# Patient Record
Sex: MALE | Race: BLACK OR AFRICAN AMERICAN | Employment: FULL TIME | URBAN - METROPOLITAN AREA
[De-identification: names, ages, dates, MRNs, and addresses within clinical notes are randomized per-mention and may not be internally consistent; named-entity substitution may affect disease eponyms.]

---

## 2024-12-19 ENCOUNTER — OFFICE VISIT (OUTPATIENT)
Dept: PRIMARY CARE CLINIC | Age: 44
End: 2024-12-19

## 2024-12-19 VITALS
BODY MASS INDEX: 17.73 KG/M2 | HEIGHT: 75 IN | SYSTOLIC BLOOD PRESSURE: 128 MMHG | OXYGEN SATURATION: 98 % | HEART RATE: 86 BPM | DIASTOLIC BLOOD PRESSURE: 78 MMHG | WEIGHT: 142.6 LBS

## 2024-12-19 DIAGNOSIS — Z13.1 SCREENING FOR DIABETES MELLITUS: ICD-10-CM

## 2024-12-19 DIAGNOSIS — Z00.00 ROUTINE ADULT HEALTH MAINTENANCE: ICD-10-CM

## 2024-12-19 DIAGNOSIS — Z11.4 SCREENING FOR HIV (HUMAN IMMUNODEFICIENCY VIRUS): ICD-10-CM

## 2024-12-19 DIAGNOSIS — Z13.220 SCREENING, LIPID: ICD-10-CM

## 2024-12-19 DIAGNOSIS — M25.551 BILATERAL HIP PAIN: Primary | ICD-10-CM

## 2024-12-19 DIAGNOSIS — Z13.29 SCREENING FOR THYROID DISORDER: ICD-10-CM

## 2024-12-19 DIAGNOSIS — Z11.59 NEED FOR HEPATITIS C SCREENING TEST: ICD-10-CM

## 2024-12-19 DIAGNOSIS — M25.552 BILATERAL HIP PAIN: Primary | ICD-10-CM

## 2024-12-19 LAB
ALBUMIN SERPL-MCNC: 4.9 G/DL (ref 3.5–4.6)
ALP SERPL-CCNC: 129 U/L (ref 35–104)
ALT SERPL-CCNC: 84 U/L (ref 0–41)
ANION GAP SERPL CALCULATED.3IONS-SCNC: 14 MEQ/L (ref 9–15)
AST SERPL-CCNC: 107 U/L (ref 0–40)
BASOPHILS # BLD: 0 K/UL (ref 0–0.2)
BASOPHILS NFR BLD: 0.4 %
BILIRUB SERPL-MCNC: 0.8 MG/DL (ref 0.2–0.7)
BUN SERPL-MCNC: 8 MG/DL (ref 6–20)
CALCIUM SERPL-MCNC: 9.9 MG/DL (ref 8.5–9.9)
CHLORIDE SERPL-SCNC: 94 MEQ/L (ref 95–107)
CHOLEST SERPL-MCNC: 200 MG/DL (ref 0–199)
CO2 SERPL-SCNC: 29 MEQ/L (ref 20–31)
CREAT SERPL-MCNC: 0.68 MG/DL (ref 0.7–1.2)
EOSINOPHIL # BLD: 0 K/UL (ref 0–0.7)
EOSINOPHIL NFR BLD: 0.6 %
ERYTHROCYTE [DISTWIDTH] IN BLOOD BY AUTOMATED COUNT: 14.6 % (ref 11.5–14.5)
GLOBULIN SER CALC-MCNC: 2.8 G/DL (ref 2.3–3.5)
GLUCOSE SERPL-MCNC: 102 MG/DL (ref 70–99)
HCT VFR BLD AUTO: 43.8 % (ref 42–52)
HDLC SERPL-MCNC: 93 MG/DL (ref 40–59)
HGB BLD-MCNC: 14.6 G/DL (ref 14–18)
LDL CHOLESTEROL: 96 MG/DL (ref 0–129)
LYMPHOCYTES # BLD: 1.4 K/UL (ref 1–4.8)
LYMPHOCYTES NFR BLD: 27.9 %
MCH RBC QN AUTO: 30 PG (ref 27–31.3)
MCHC RBC AUTO-ENTMCNC: 33.3 % (ref 33–37)
MCV RBC AUTO: 90.1 FL (ref 79–92.2)
MONOCYTES # BLD: 0.6 K/UL (ref 0.2–0.8)
MONOCYTES NFR BLD: 12 %
NEUTROPHILS # BLD: 3 K/UL (ref 1.4–6.5)
NEUTS SEG NFR BLD: 58.7 %
PLATELET # BLD AUTO: 221 K/UL (ref 130–400)
POTASSIUM SERPL-SCNC: 4 MEQ/L (ref 3.4–4.9)
PROT SERPL-MCNC: 7.7 G/DL (ref 6.3–8)
RBC # BLD AUTO: 4.86 M/UL (ref 4.7–6.1)
SODIUM SERPL-SCNC: 137 MEQ/L (ref 135–144)
TRIGLYCERIDE, FASTING: 56 MG/DL (ref 0–150)
TSH REFLEX: 2.54 UIU/ML (ref 0.44–3.86)
WBC # BLD AUTO: 5 K/UL (ref 4.8–10.8)

## 2024-12-19 RX ORDER — IBUPROFEN 600 MG/1
600 TABLET, FILM COATED ORAL 3 TIMES DAILY PRN
Qty: 30 TABLET | Refills: 5 | Status: SHIPPED | OUTPATIENT
Start: 2024-12-19

## 2024-12-19 SDOH — ECONOMIC STABILITY: FOOD INSECURITY: WITHIN THE PAST 12 MONTHS, YOU WORRIED THAT YOUR FOOD WOULD RUN OUT BEFORE YOU GOT MONEY TO BUY MORE.: NEVER TRUE

## 2024-12-19 SDOH — ECONOMIC STABILITY: INCOME INSECURITY: HOW HARD IS IT FOR YOU TO PAY FOR THE VERY BASICS LIKE FOOD, HOUSING, MEDICAL CARE, AND HEATING?: NOT VERY HARD

## 2024-12-19 SDOH — ECONOMIC STABILITY: FOOD INSECURITY: WITHIN THE PAST 12 MONTHS, THE FOOD YOU BOUGHT JUST DIDN'T LAST AND YOU DIDN'T HAVE MONEY TO GET MORE.: NEVER TRUE

## 2024-12-19 ASSESSMENT — PATIENT HEALTH QUESTIONNAIRE - PHQ9
SUM OF ALL RESPONSES TO PHQ QUESTIONS 1-9: 0
SUM OF ALL RESPONSES TO PHQ9 QUESTIONS 1 & 2: 0
2. FEELING DOWN, DEPRESSED OR HOPELESS: NOT AT ALL
SUM OF ALL RESPONSES TO PHQ QUESTIONS 1-9: 0
1. LITTLE INTEREST OR PLEASURE IN DOING THINGS: NOT AT ALL

## 2024-12-19 NOTE — PROGRESS NOTES
bilateral hips.).      Cervical back: Normal range of motion.      Comments: Pain on hip flexion bilaterally  Pain on hip external rotation bilaterally   Skin:     Findings: No rash.   Neurological:      General: No focal deficit present.      Mental Status: He is alert and oriented to person, place, and time.   Psychiatric:         Mood and Affect: Mood normal.               Reviewed with the patient: current clinical status, medications, activities and diet.     Side effects, adverse effects of the medication prescribed today, as well as treatment plan/ rationale and result expectations have been discussed with the patient who expresses understanding and desires to proceed.    Close follow up to evaluate treatment results and for coordination of care.  I have reviewed the patient's medical history in detail and updated the computerized patient record.    Daron Castillo MD

## 2024-12-20 DIAGNOSIS — R73.03 PREDIABETES: ICD-10-CM

## 2024-12-20 DIAGNOSIS — M87.051 AVASCULAR NECROSIS OF BONES OF BOTH HIPS: Primary | ICD-10-CM

## 2024-12-20 DIAGNOSIS — R74.01 TRANSAMINITIS: ICD-10-CM

## 2024-12-20 DIAGNOSIS — M87.052 AVASCULAR NECROSIS OF BONES OF BOTH HIPS: Primary | ICD-10-CM

## 2024-12-20 LAB
ESTIMATED AVERAGE GLUCOSE: 123 MG/DL
HBA1C MFR BLD: 5.9 % (ref 4–6)
HEPATITIS C ANTIBODY: NONREACTIVE
HIV AG/AB: NONREACTIVE

## 2025-01-03 ENCOUNTER — OFFICE VISIT (OUTPATIENT)
Dept: ORTHOPEDIC SURGERY | Age: 45
End: 2025-01-03

## 2025-01-03 VITALS
WEIGHT: 142 LBS | HEIGHT: 75 IN | HEART RATE: 77 BPM | OXYGEN SATURATION: 99 % | BODY MASS INDEX: 17.66 KG/M2 | TEMPERATURE: 98.4 F

## 2025-01-03 DIAGNOSIS — M16.11 PRIMARY OSTEOARTHRITIS OF RIGHT HIP: Primary | ICD-10-CM

## 2025-01-03 DIAGNOSIS — M16.12 PRIMARY OSTEOARTHRITIS OF LEFT HIP: ICD-10-CM

## 2025-01-03 DIAGNOSIS — M87.851 OTHER OSTEONECROSIS, RIGHT FEMUR: ICD-10-CM

## 2025-01-03 DIAGNOSIS — M87.852 OTHER OSTEONECROSIS, LEFT FEMUR: ICD-10-CM

## 2025-01-03 ASSESSMENT — ENCOUNTER SYMPTOMS
EYE DISCHARGE: 0
DIARRHEA: 0
SHORTNESS OF BREATH: 0
EYE PAIN: 0
ABDOMINAL PAIN: 0
COUGH: 0
CONSTIPATION: 0
EYE ITCHING: 0

## 2025-01-03 NOTE — PROGRESS NOTES
Patient ID:  Robi Dotson is a 44 y.o. male who presents today for evaluation of bilateral hip pain.  Left more than right.  Left one year, right 6 months.  Steroid shot on left.  A year ago    Injury: no  Metal Allergy: no    Location of pain:  bilateral groin and anterior thigh  Pain: yes; 7 on a scale of 1 to 10  Onset: gradual  Duration: 1 years  Frequency:  occurs daily  Quality: aching and boring   Swelling: none  Aggravating factors: weight bearing activity, standing, and walking  Alleviating factors: removing weight from leg and rest  Mechanical symptoms: none  Radiation: no    Activities: walking independently  Restriction:  decreased ambulatory tolerance  Progression:  worsening    Previous treatment:  anti-inflammatories and steroid injection   NSAIDs:  ibuprofen/motrin  PT:  none    Medications:    Current Outpatient Medications on File Prior to Visit   Medication Sig Dispense Refill    ibuprofen (ADVIL;MOTRIN) 600 MG tablet Take 1 tablet by mouth 3 times daily as needed for Pain 30 tablet 5     No current facility-administered medications on file prior to visit.       Allergies:    No Known Allergies    Past Medical History:    No past medical history on file.    Past Surgical History:    No past surgical history on file.    Social History:    Social History     Socioeconomic History    Marital status: Unknown     Spouse name: Not on file    Number of children: Not on file    Years of education: Not on file    Highest education level: Not on file   Occupational History    Not on file   Tobacco Use    Smoking status: Every Day     Current packs/day: 0.50     Types: Cigarettes    Smokeless tobacco: Never   Vaping Use    Vaping status: Never Used   Substance and Sexual Activity    Alcohol use: Yes     Alcohol/week: 2.0 standard drinks of alcohol     Types: 2 Glasses of wine per week    Drug use: Not Currently    Sexual activity: Not on file   Other Topics Concern    Not on file   Social History

## 2025-01-08 ENCOUNTER — HOSPITAL ENCOUNTER (OUTPATIENT)
Dept: MRI IMAGING | Age: 45
Discharge: HOME | End: 2025-01-10
Payer: COMMERCIAL

## 2025-01-08 DIAGNOSIS — M16.12 PRIMARY OSTEOARTHRITIS OF LEFT HIP: ICD-10-CM

## 2025-01-08 DIAGNOSIS — M87.852 OTHER OSTEONECROSIS, LEFT FEMUR: ICD-10-CM

## 2025-01-08 PROCEDURE — 73721 MRI JNT OF LWR EXTRE W/O DYE: CPT

## 2025-01-16 ENCOUNTER — OFFICE VISIT (OUTPATIENT)
Dept: ORTHOPEDIC SURGERY | Age: 45
End: 2025-01-16
Payer: COMMERCIAL

## 2025-01-16 VITALS
HEART RATE: 84 BPM | HEIGHT: 75 IN | OXYGEN SATURATION: 96 % | WEIGHT: 142 LBS | BODY MASS INDEX: 17.66 KG/M2 | TEMPERATURE: 98 F

## 2025-01-16 DIAGNOSIS — M87.052 AVASCULAR NECROSIS OF LEFT FEMUR: Primary | ICD-10-CM

## 2025-01-16 DIAGNOSIS — M87.051 AVASCULAR NECROSIS OF RIGHT FEMUR: ICD-10-CM

## 2025-01-16 PROCEDURE — 99214 OFFICE O/P EST MOD 30 MIN: CPT | Performed by: ORTHOPAEDIC SURGERY

## 2025-01-16 NOTE — PROGRESS NOTES
Patient ID:  Robi Dotson is a 44 y.o. male who presents today for follow-up of left hip pain.  It appeared that the patient had bilateral hip avascular necrosis.  He was sent for an MRI.  MRI from 1/9/2025 shows:    FINDINGS:  BONE MARROW: No evidence of acute fracture or aggressive marrow replacing  lesion.     HIP JOINT: Advanced left avascular necrosis is observed.  In the left hip, it  measures 4.3 x 4.9 cm, involving the anterior superior, superior, and  posterosuperior aspects of the femoral head.  There is flattening of the  femoral head contour.  Associated subchondral cysts are observed.  There is  an associated moderate-sized joint effusion and generalized reactive edema  throughout the femoral head and proximal neck small grade 4 chondral fissure  noted in the anterior superior acetabular zone.  Large areas of grade 2  chondral thinning are otherwise seen in the superior and posterosuperior  zones.     Similar advanced avascular necrosis and osteoarthrosis observed in the right  hip, along with joint effusion     LABRUM: Intact anterior, anterior superior, superior, posterosuperior, and  posterior labrum     BURSAE: No significant iliopsoas or trochanteric bursitis.     SCIATIC NERVE: Normal MRI appearance of the sciatic nerve.     MUSCLES / TENDONS: No evidence of gluteal tendon tear. Intact hamstrings  tendon origin. No significant muscle edema or atrophy.     INTRAPELVIC CONTENTS / SOFT TISSUES: Limited images of the intrapelvic  contents demonstrate no acute abnormality.     IMPRESSION:  1. 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  2. Bilateral hip osteoarthrosis, with small grade 4 chondral fissure in the  anterior superior acetabular zone.    Injury: no    Location of pain:  bilateral groin and anterior thigh  Pain: yes; 7 on a scale of 1 to 10  Onset:

## 2025-01-30 ENCOUNTER — TELEPHONE (OUTPATIENT)
Dept: ORTHOPEDIC SURGERY | Age: 45
End: 2025-01-30

## 2025-01-31 ENCOUNTER — TELEPHONE (OUTPATIENT)
Dept: ORTHOPEDIC SURGERY | Age: 45
End: 2025-01-31

## 2025-01-31 DIAGNOSIS — M16.12 PRIMARY OSTEOARTHRITIS OF LEFT HIP: Primary | ICD-10-CM

## 2025-01-31 DIAGNOSIS — M87.052 AVASCULAR NECROSIS OF LEFT FEMUR: ICD-10-CM

## 2025-01-31 NOTE — TELEPHONE ENCOUNTER
Surgery Phone: [phone]   [unfilled]   Surgery Fax: [phone]    Phone: [phone]          Fax: [phone]    Orthopedics: Surgery Scheduling, PAT & PRE-OP Order Form  Call to advance [place] at [phone] at least 24 hours prior to date of service     Surgery Location: [facility] Surgery: [facility] [place] [address]  OFFICE   Surgeon's Name:Miguel Conley MD Surgery Date: 25  Time: 730  Patient's Name: Robi Majano : 1980    #:  [id number]  Gender: male  Home Phone:  [phone] Cell Phone: [phone]  Emergency Contact:  tierney majano   Phone: [phone]  Payor: [email or web address] [place] /  /  /    ID No.: [unfilled]      PROVIDER TO COMPLETE:  Diagnosis: The primary encounter diagnosis was Primary osteoarthritis of left hip. A diagnosis of Avascular necrosis of left femur was also pertinent to this visit.   Procedure/Consent: Left total hip arthroplasty - ANTERIOR  CPT CODES: 64845  Case Comments/Implants: [place] Accolade Stem and Trident 2 cup System  Surgery Scheduled as: Outpatient  Anesthesia Requested: Choice  Referring Family Doctor: Daron Castillo MD   PAT  [x] [facility] PAT Date/Time:                                                            [x] History & Physical [] Physician will Provide [] Attached [] Dictated [] Other  [x] Follow Anesthesia Pre-Op Orders for X-rays, [facility]     [x] SN & PT to evaluate and treat/educate disease management, medications, home safety & equipment needs for total joint patients  [] Other: ____________________________________________________  Consults: Medical/Cardiac Clearance done by  ____________________  PRE-OP ORDERS:   Allergies: Patient has no known allergies. Latex Allergies:             Diabetic:           [] IV ________________________  [x] IV Start with J-loop     Preprinted Orders: Attached [] Yes [] No   ANTIBIOTIC PRE-OP: [x] ANCEF 2 gram IVPB if > 120 kg 3 grams IVPB within 1 hour of

## 2025-02-06 ENCOUNTER — PREP FOR PROCEDURE (OUTPATIENT)
Dept: ORTHOPEDIC SURGERY | Age: 45
End: 2025-02-06

## 2025-02-06 DIAGNOSIS — M87.052 AVASCULAR NECROSIS OF FEMORAL HEAD, LEFT (HCC): ICD-10-CM

## 2025-02-18 ENCOUNTER — HOSPITAL ENCOUNTER (OUTPATIENT)
Dept: PREADMISSION TESTING | Age: 45
Discharge: HOME | End: 2025-02-22
Payer: COMMERCIAL

## 2025-02-18 ENCOUNTER — OFFICE VISIT (OUTPATIENT)
Dept: ORTHOPEDIC SURGERY | Age: 45
End: 2025-02-18
Payer: COMMERCIAL

## 2025-02-18 VITALS
RESPIRATION RATE: 16 BRPM | WEIGHT: 142 LBS | BODY MASS INDEX: 17.66 KG/M2 | HEIGHT: 75 IN | DIASTOLIC BLOOD PRESSURE: 82 MMHG | HEART RATE: 98 BPM | SYSTOLIC BLOOD PRESSURE: 138 MMHG

## 2025-02-18 DIAGNOSIS — M87.052 AVASCULAR NECROSIS OF LEFT FEMUR (HCC): ICD-10-CM

## 2025-02-18 DIAGNOSIS — Z96.642 HISTORY OF LEFT HIP REPLACEMENT: Primary | ICD-10-CM

## 2025-02-18 LAB
ABO/RH: NORMAL
ANION GAP SERPL CALCULATED.3IONS-SCNC: 16 MEQ/L (ref 9–15)
ANTIBODY SCREEN: NORMAL
BASOPHILS # BLD: 0 K/UL (ref 0–0.2)
BASOPHILS NFR BLD: 0.6 %
BUN SERPL-MCNC: 5 MG/DL (ref 6–20)
CALCIUM SERPL-MCNC: 9.9 MG/DL (ref 8.5–9.9)
CHLORIDE SERPL-SCNC: 96 MEQ/L (ref 95–107)
CO2 SERPL-SCNC: 28 MEQ/L (ref 20–31)
CREAT SERPL-MCNC: 0.62 MG/DL (ref 0.7–1.2)
EOSINOPHIL # BLD: 0 K/UL (ref 0–0.7)
EOSINOPHIL NFR BLD: 0.4 %
ERYTHROCYTE [DISTWIDTH] IN BLOOD BY AUTOMATED COUNT: 13.2 % (ref 11.5–14.5)
GLUCOSE SERPL-MCNC: 113 MG/DL (ref 70–99)
HCT VFR BLD AUTO: 41.8 % (ref 42–52)
HGB BLD-MCNC: 13.7 G/DL (ref 14–18)
LYMPHOCYTES # BLD: 1.5 K/UL (ref 1–4.8)
LYMPHOCYTES NFR BLD: 27.3 %
MCH RBC QN AUTO: 30.9 PG (ref 27–31.3)
MCHC RBC AUTO-ENTMCNC: 32.8 % (ref 33–37)
MCV RBC AUTO: 94.4 FL (ref 79–92.2)
MONOCYTES # BLD: 0.6 K/UL (ref 0.2–0.8)
MONOCYTES NFR BLD: 11 %
NEUTROPHILS # BLD: 3.2 K/UL (ref 1.4–6.5)
NEUTS SEG NFR BLD: 60.5 %
PLATELET # BLD AUTO: 222 K/UL (ref 130–400)
POTASSIUM SERPL-SCNC: 4.1 MEQ/L (ref 3.4–4.9)
RBC # BLD AUTO: 4.43 M/UL (ref 4.7–6.1)
SODIUM SERPL-SCNC: 140 MEQ/L (ref 135–144)
WBC # BLD AUTO: 5.4 K/UL (ref 4.8–10.8)

## 2025-02-18 PROCEDURE — 87641 MR-STAPH DNA AMP PROBE: CPT

## 2025-02-18 PROCEDURE — 80048 BASIC METABOLIC PNL TOTAL CA: CPT

## 2025-02-18 PROCEDURE — 86901 BLOOD TYPING SEROLOGIC RH(D): CPT

## 2025-02-18 PROCEDURE — 86900 BLOOD TYPING SEROLOGIC ABO: CPT

## 2025-02-18 PROCEDURE — 99024 POSTOP FOLLOW-UP VISIT: CPT | Performed by: PHYSICIAN ASSISTANT

## 2025-02-18 PROCEDURE — 86850 RBC ANTIBODY SCREEN: CPT

## 2025-02-18 PROCEDURE — 85025 COMPLETE CBC W/AUTO DIFF WBC: CPT

## 2025-02-18 PROCEDURE — 99424 PRIN CARE MGMT PHYS 1ST 30: CPT | Performed by: PHYSICIAN ASSISTANT

## 2025-02-18 RX ORDER — SODIUM CHLORIDE 9 MG/ML
INJECTION, SOLUTION INTRAVENOUS PRN
OUTPATIENT
Start: 2025-02-25

## 2025-02-18 RX ORDER — SODIUM CHLORIDE 0.9 % (FLUSH) 0.9 %
5-40 SYRINGE (ML) INJECTION EVERY 12 HOURS SCHEDULED
OUTPATIENT
Start: 2025-02-25

## 2025-02-18 RX ORDER — SODIUM CHLORIDE, SODIUM LACTATE, POTASSIUM CHLORIDE, CALCIUM CHLORIDE 600; 310; 30; 20 MG/100ML; MG/100ML; MG/100ML; MG/100ML
INJECTION, SOLUTION INTRAVENOUS CONTINUOUS
OUTPATIENT
Start: 2025-02-25

## 2025-02-18 RX ORDER — CHLORHEXIDINE GLUCONATE 40 MG/ML
SOLUTION TOPICAL
Qty: 118 ML | Refills: 0 | Status: SHIPPED | OUTPATIENT
Start: 2025-02-18

## 2025-02-18 RX ORDER — SODIUM CHLORIDE 0.9 % (FLUSH) 0.9 %
5-40 SYRINGE (ML) INJECTION PRN
OUTPATIENT
Start: 2025-02-25

## 2025-02-18 NOTE — H&P
[facility]    H&P: Preadmission Testing     Patient: Robi Dotson  YOB: 1980  MRN: [id number]    Subjective:     Chief Complaint   Patient presents with    Pre-op Exam     Patient presents to clinic for preop left total hip athroplasty  Symptoms feeling well, minimal pain   Pain 3       HPI: Robi Dotson is a 44 y.o. maleis here for left anterior total hip replacement to be performed by Dr. Conley.       There is no known history of heart disease or infarction.  There is no recent chest pain or discomfort, palpitations, shortness of breath, BHAKTA, difficulties with exercise, bilateral ankle swelling.  Not taking any blood thinners.    There is no known history of obstructive or restrictive lung disease.    No smoking.  No recent wheezing or use of any kind of inhalers.  No recent hospitalizations for any lung-related illnesses. No recent Covid infection.    No known history of gastric issues which includes ulcers, herniations, bariatric surgeries.  No recent GI bleeds    No known history of hyper or hypercoagulable states.    They are not diabetic.  They have normal kidney function.     Past Medical History:    No past medical history on file.  Past Surgical History:    No past surgical history on file.    Medications Prior to Admission:    Current Outpatient Medications   Medication Sig Dispense Refill    chlorhexidine gluconate (HIBICLENS) 4 % SOLN external solution Use daily for 3 days prior to surgery 118 mL 0    Misc. Devices (WALKER) MISC 1 each by Does not apply route daily Front wheeled walker -  fax to [phone] 1 each 0    Misc. Devices (CANE) MISC 1 Units by Does not apply route as needed (ambulation) 1 each 0    Misc. Devices (RAISED TOILET SEAT) MISC Dispense 1 raised toilet seat 1 each 0    Misc. Devices (BATH/SHOWER SEAT) MISC Dispense 1 Shower/Bath seat 1 each 0    Misc. Devices (BATH/SHOWER SEAT) MISC Dispense 1 Shower/Bath seat 1 each 0

## 2025-02-18 NOTE — PATIENT INSTRUCTIONS
-please ensure that blood work is done at least 3 days before your surgery. If there are any abnormalities that are out of the norm for you, I will reach out to discuss those results within the next two days.     -hibiclens was sent to your pharmacy to .  Please follow the instructions provided with the prescription and use daily 3 days leading up to surgery.     -all anti-inflammatories need to be stopped 5 days leading up to the surgery.  This includes asprin (most importantly) ibuprofen, naprosyn, meloxicam, celecoxib, aleve, diflonac, toradol, etc. If you experiencing pain, the only medication you can safely take is tylenol 650 mg 3-4x / day (do not exceed 4000 mg).    -our surgery department will reach out the you the evening before your surgery to let you know what time to arrive at the Outpatient Entrance (door B)    -no eating / drinking the after midnight the night before surgery. Sips of water the morning of for all other medications is OK    If you have any questions, please call our office and I will be happy to answer them

## 2025-02-19 LAB
MRSA, DNA, NASAL: NEGATIVE
SPECIMEN DESCRIPTION: NORMAL

## 2025-02-25 ENCOUNTER — HOSPITAL ENCOUNTER (OUTPATIENT)
Age: 45
Setting detail: OBSERVATION
Discharge: HOME HEALTH | End: 2025-02-25
Attending: ORTHOPAEDIC SURGERY | Admitting: ORTHOPAEDIC SURGERY
Payer: COMMERCIAL

## 2025-02-25 ENCOUNTER — ANESTHESIA (OUTPATIENT)
Dept: OPERATING ROOM | Age: 45
End: 2025-02-25
Payer: COMMERCIAL

## 2025-02-25 ENCOUNTER — APPOINTMENT (OUTPATIENT)
Dept: GENERAL RADIOLOGY | Age: 45
End: 2025-02-25
Attending: ORTHOPAEDIC SURGERY
Payer: COMMERCIAL

## 2025-02-25 ENCOUNTER — ANESTHESIA EVENT (OUTPATIENT)
Dept: OPERATING ROOM | Age: 45
End: 2025-02-25
Payer: COMMERCIAL

## 2025-02-25 VITALS
HEART RATE: 81 BPM | OXYGEN SATURATION: 99 % | SYSTOLIC BLOOD PRESSURE: 136 MMHG | BODY MASS INDEX: 17.71 KG/M2 | TEMPERATURE: 98.6 F | HEIGHT: 77 IN | WEIGHT: 150 LBS | DIASTOLIC BLOOD PRESSURE: 95 MMHG | RESPIRATION RATE: 12 BRPM

## 2025-02-25 DIAGNOSIS — Z96.642 STATUS POST TOTAL HIP REPLACEMENT, LEFT: Primary | ICD-10-CM

## 2025-02-25 DIAGNOSIS — G89.18 POST-OP PAIN: ICD-10-CM

## 2025-02-25 PROCEDURE — G0378 HOSPITAL OBSERVATION PER HR: HCPCS

## 2025-02-25 PROCEDURE — 2580000003 HC RX 258: Performed by: NURSE PRACTITIONER

## 2025-02-25 PROCEDURE — 2500000003 HC RX 250 WO HCPCS: Performed by: ORTHOPAEDIC SURGERY

## 2025-02-25 PROCEDURE — 6360000002 HC RX W HCPCS: Performed by: STUDENT IN AN ORGANIZED HEALTH CARE EDUCATION/TRAINING PROGRAM

## 2025-02-25 PROCEDURE — 97162 PT EVAL MOD COMPLEX 30 MIN: CPT

## 2025-02-25 PROCEDURE — 97110 THERAPEUTIC EXERCISES: CPT

## 2025-02-25 PROCEDURE — 72170 X-RAY EXAM OF PELVIS: CPT

## 2025-02-25 PROCEDURE — 6370000000 HC RX 637 (ALT 250 FOR IP): Performed by: NURSE PRACTITIONER

## 2025-02-25 PROCEDURE — 94664 DEMO&/EVAL PT USE INHALER: CPT

## 2025-02-25 PROCEDURE — 2580000003 HC RX 258: Performed by: PHYSICIAN ASSISTANT

## 2025-02-25 PROCEDURE — 7100000001 HC PACU RECOVERY - ADDTL 15 MIN: Performed by: ORTHOPAEDIC SURGERY

## 2025-02-25 PROCEDURE — 7100000000 HC PACU RECOVERY - FIRST 15 MIN: Performed by: ORTHOPAEDIC SURGERY

## 2025-02-25 PROCEDURE — 3600000014 HC SURGERY LEVEL 4 ADDTL 15MIN: Performed by: ORTHOPAEDIC SURGERY

## 2025-02-25 PROCEDURE — 97530 THERAPEUTIC ACTIVITIES: CPT

## 2025-02-25 PROCEDURE — 3600000004 HC SURGERY LEVEL 4 BASE: Performed by: ORTHOPAEDIC SURGERY

## 2025-02-25 PROCEDURE — 6360000002 HC RX W HCPCS: Performed by: PHYSICIAN ASSISTANT

## 2025-02-25 PROCEDURE — 27130 TOTAL HIP ARTHROPLASTY: CPT | Performed by: PHYSICIAN ASSISTANT

## 2025-02-25 PROCEDURE — 2580000003 HC RX 258: Performed by: ORTHOPAEDIC SURGERY

## 2025-02-25 PROCEDURE — C1776 JOINT DEVICE (IMPLANTABLE): HCPCS | Performed by: ORTHOPAEDIC SURGERY

## 2025-02-25 PROCEDURE — 3700000001 HC ADD 15 MINUTES (ANESTHESIA): Performed by: ORTHOPAEDIC SURGERY

## 2025-02-25 PROCEDURE — 3700000000 HC ANESTHESIA ATTENDED CARE: Performed by: ORTHOPAEDIC SURGERY

## 2025-02-25 PROCEDURE — 27130 TOTAL HIP ARTHROPLASTY: CPT | Performed by: ORTHOPAEDIC SURGERY

## 2025-02-25 PROCEDURE — 6360000002 HC RX W HCPCS: Performed by: ORTHOPAEDIC SURGERY

## 2025-02-25 PROCEDURE — 2720000010 HC SURG SUPPLY STERILE: Performed by: ORTHOPAEDIC SURGERY

## 2025-02-25 PROCEDURE — 6360000002 HC RX W HCPCS: Performed by: NURSE PRACTITIONER

## 2025-02-25 PROCEDURE — 2709999900 HC NON-CHARGEABLE SUPPLY: Performed by: ORTHOPAEDIC SURGERY

## 2025-02-25 DEVICE — TRIDENT X3 0 DEGREE POLYETHYLENE INSERT
Type: IMPLANTABLE DEVICE | Site: HIP | Status: FUNCTIONAL
Brand: TRIDENT X3 INSERT

## 2025-02-25 DEVICE — UNIVERSAL V40 TAPER ADAPTER SLEEVE
Type: IMPLANTABLE DEVICE | Site: HIP | Status: FUNCTIONAL
Brand: ADAPTER SLEEVE

## 2025-02-25 DEVICE — 127 DEGREE NECK ANGLE HIP STEM
Type: IMPLANTABLE DEVICE | Site: HIP | Status: FUNCTIONAL
Brand: ACCOLADE

## 2025-02-25 DEVICE — COMPONENT TOT HIP CAPPED LNR POLYETH H2STRYKER] STRYKER CORP]: Type: IMPLANTABLE DEVICE | Site: HIP | Status: FUNCTIONAL

## 2025-02-25 DEVICE — TRIDENT II TRITANIUM CLUSTER 58F
Type: IMPLANTABLE DEVICE | Site: HIP | Status: FUNCTIONAL
Brand: TRIDENT II

## 2025-02-25 DEVICE — ANATOMIC UNIVERSAL TAPER FEMORAL HEAD
Type: IMPLANTABLE DEVICE | Site: HIP | Status: FUNCTIONAL
Brand: BIOLOX

## 2025-02-25 RX ORDER — OXYCODONE HCL 10 MG/1
10 TABLET, FILM COATED, EXTENDED RELEASE ORAL ONCE
Status: COMPLETED | OUTPATIENT
Start: 2025-02-25 | End: 2025-02-25

## 2025-02-25 RX ORDER — ACETAMINOPHEN 325 MG/1
650 TABLET ORAL
Status: DISCONTINUED | OUTPATIENT
Start: 2025-02-25 | End: 2025-02-25 | Stop reason: HOSPADM

## 2025-02-25 RX ORDER — SODIUM CHLORIDE 9 MG/ML
INJECTION, SOLUTION INTRAVENOUS CONTINUOUS
Status: DISCONTINUED | OUTPATIENT
Start: 2025-02-25 | End: 2025-02-25 | Stop reason: HOSPADM

## 2025-02-25 RX ORDER — OXYCODONE HYDROCHLORIDE 5 MG/1
2.5 TABLET ORAL EVERY 4 HOURS PRN
Status: DISCONTINUED | OUTPATIENT
Start: 2025-02-25 | End: 2025-02-25 | Stop reason: HOSPADM

## 2025-02-25 RX ORDER — SODIUM CHLORIDE 0.9 % (FLUSH) 0.9 %
5-40 SYRINGE (ML) INJECTION PRN
Status: DISCONTINUED | OUTPATIENT
Start: 2025-02-25 | End: 2025-02-25 | Stop reason: HOSPADM

## 2025-02-25 RX ORDER — METOCLOPRAMIDE HYDROCHLORIDE 5 MG/ML
10 INJECTION INTRAMUSCULAR; INTRAVENOUS
Status: DISCONTINUED | OUTPATIENT
Start: 2025-02-25 | End: 2025-02-25 | Stop reason: HOSPADM

## 2025-02-25 RX ORDER — TRANEXAMIC ACID 650 MG/1
1950 TABLET ORAL ONCE
Status: DISCONTINUED | OUTPATIENT
Start: 2025-02-26 | End: 2025-02-25 | Stop reason: HOSPADM

## 2025-02-25 RX ORDER — OXYCODONE AND ACETAMINOPHEN 5; 325 MG/1; MG/1
1 TABLET ORAL EVERY 4 HOURS PRN
Qty: 40 TABLET | Refills: 0 | Status: SHIPPED | OUTPATIENT
Start: 2025-02-25 | End: 2025-03-04

## 2025-02-25 RX ORDER — ASPIRIN 81 MG/1
81 TABLET ORAL 2 TIMES DAILY
Qty: 60 TABLET | Refills: 0 | Status: SHIPPED | OUTPATIENT
Start: 2025-02-25 | End: 2025-03-27

## 2025-02-25 RX ORDER — BUPIVACAINE HYDROCHLORIDE 7.5 MG/ML
INJECTION, SOLUTION INTRASPINAL
Status: COMPLETED | OUTPATIENT
Start: 2025-02-25 | End: 2025-02-25

## 2025-02-25 RX ORDER — ACETAMINOPHEN 325 MG/1
650 TABLET ORAL EVERY 6 HOURS
Status: DISCONTINUED | OUTPATIENT
Start: 2025-02-25 | End: 2025-02-25 | Stop reason: HOSPADM

## 2025-02-25 RX ORDER — MIDAZOLAM HYDROCHLORIDE 1 MG/ML
INJECTION, SOLUTION INTRAMUSCULAR; INTRAVENOUS
Status: COMPLETED | OUTPATIENT
Start: 2025-02-25 | End: 2025-02-25

## 2025-02-25 RX ORDER — SODIUM CHLORIDE 0.9 % (FLUSH) 0.9 %
5-40 SYRINGE (ML) INJECTION EVERY 12 HOURS SCHEDULED
Status: DISCONTINUED | OUTPATIENT
Start: 2025-02-25 | End: 2025-02-25 | Stop reason: HOSPADM

## 2025-02-25 RX ORDER — CYCLOBENZAPRINE HCL 10 MG
10 TABLET ORAL 3 TIMES DAILY PRN
Qty: 30 TABLET | Refills: 0 | Status: SHIPPED | OUTPATIENT
Start: 2025-02-25 | End: 2025-03-07

## 2025-02-25 RX ORDER — ACETAMINOPHEN 500 MG
1000 TABLET ORAL ONCE
Status: COMPLETED | OUTPATIENT
Start: 2025-02-25 | End: 2025-02-25

## 2025-02-25 RX ORDER — HYDROXYZINE HYDROCHLORIDE 10 MG/1
10 TABLET, FILM COATED ORAL 3 TIMES DAILY
Qty: 15 TABLET | Refills: 0 | Status: SHIPPED | OUTPATIENT
Start: 2025-02-25 | End: 2025-03-02

## 2025-02-25 RX ORDER — ONDANSETRON 4 MG/1
4 TABLET, ORALLY DISINTEGRATING ORAL EVERY 8 HOURS PRN
Status: DISCONTINUED | OUTPATIENT
Start: 2025-02-25 | End: 2025-02-25 | Stop reason: HOSPADM

## 2025-02-25 RX ORDER — HYDROXYZINE HYDROCHLORIDE 10 MG/1
10 TABLET, FILM COATED ORAL 3 TIMES DAILY
Status: DISCONTINUED | OUTPATIENT
Start: 2025-02-25 | End: 2025-02-25 | Stop reason: HOSPADM

## 2025-02-25 RX ORDER — SENNA AND DOCUSATE SODIUM 50; 8.6 MG/1; MG/1
1 TABLET, FILM COATED ORAL 2 TIMES DAILY
Qty: 60 TABLET | Refills: 0 | Status: SHIPPED | OUTPATIENT
Start: 2025-02-25 | End: 2025-03-27

## 2025-02-25 RX ORDER — OXYCODONE HYDROCHLORIDE 5 MG/1
5 TABLET ORAL EVERY 4 HOURS PRN
Status: DISCONTINUED | OUTPATIENT
Start: 2025-02-25 | End: 2025-02-25 | Stop reason: HOSPADM

## 2025-02-25 RX ORDER — SODIUM CHLORIDE, SODIUM LACTATE, POTASSIUM CHLORIDE, CALCIUM CHLORIDE 600; 310; 30; 20 MG/100ML; MG/100ML; MG/100ML; MG/100ML
INJECTION, SOLUTION INTRAVENOUS CONTINUOUS
Status: DISCONTINUED | OUTPATIENT
Start: 2025-02-25 | End: 2025-02-25 | Stop reason: HOSPADM

## 2025-02-25 RX ORDER — ASPIRIN 81 MG/1
81 TABLET ORAL 2 TIMES DAILY
Status: DISCONTINUED | OUTPATIENT
Start: 2025-02-25 | End: 2025-02-25 | Stop reason: HOSPADM

## 2025-02-25 RX ORDER — CEPHALEXIN 500 MG/1
500 CAPSULE ORAL 3 TIMES DAILY
Qty: 21 CAPSULE | Refills: 0 | Status: SHIPPED | OUTPATIENT
Start: 2025-02-25 | End: 2025-03-04

## 2025-02-25 RX ORDER — MAGNESIUM HYDROXIDE 1200 MG/15ML
LIQUID ORAL CONTINUOUS PRN
Status: DISCONTINUED | OUTPATIENT
Start: 2025-02-25 | End: 2025-02-25 | Stop reason: HOSPADM

## 2025-02-25 RX ORDER — CELECOXIB 100 MG/1
200 CAPSULE ORAL ONCE
Status: COMPLETED | OUTPATIENT
Start: 2025-02-25 | End: 2025-02-25

## 2025-02-25 RX ORDER — MAGNESIUM HYDROXIDE/ALUMINUM HYDROXICE/SIMETHICONE 120; 1200; 1200 MG/30ML; MG/30ML; MG/30ML
15 SUSPENSION ORAL EVERY 6 HOURS PRN
Status: DISCONTINUED | OUTPATIENT
Start: 2025-02-25 | End: 2025-02-25 | Stop reason: HOSPADM

## 2025-02-25 RX ORDER — PROPOFOL 10 MG/ML
INJECTION, EMULSION INTRAVENOUS
Status: DISCONTINUED | OUTPATIENT
Start: 2025-02-25 | End: 2025-02-25 | Stop reason: SDUPTHER

## 2025-02-25 RX ORDER — CYCLOBENZAPRINE HCL 10 MG
10 TABLET ORAL 3 TIMES DAILY PRN
Status: DISCONTINUED | OUTPATIENT
Start: 2025-02-25 | End: 2025-02-25 | Stop reason: HOSPADM

## 2025-02-25 RX ORDER — OXYCODONE HYDROCHLORIDE 5 MG/1
5 TABLET ORAL
Status: DISCONTINUED | OUTPATIENT
Start: 2025-02-25 | End: 2025-02-25 | Stop reason: HOSPADM

## 2025-02-25 RX ORDER — SENNA AND DOCUSATE SODIUM 50; 8.6 MG/1; MG/1
1 TABLET, FILM COATED ORAL 2 TIMES DAILY
Status: DISCONTINUED | OUTPATIENT
Start: 2025-02-25 | End: 2025-02-25 | Stop reason: HOSPADM

## 2025-02-25 RX ORDER — SODIUM CHLORIDE 9 MG/ML
INJECTION, SOLUTION INTRAVENOUS PRN
Status: DISCONTINUED | OUTPATIENT
Start: 2025-02-25 | End: 2025-02-25 | Stop reason: HOSPADM

## 2025-02-25 RX ORDER — TRANEXAMIC ACID 650 MG/1
1950 TABLET ORAL
Status: DISCONTINUED | OUTPATIENT
Start: 2025-02-25 | End: 2025-02-25 | Stop reason: HOSPADM

## 2025-02-25 RX ORDER — ONDANSETRON 2 MG/ML
4 INJECTION INTRAMUSCULAR; INTRAVENOUS EVERY 6 HOURS PRN
Status: DISCONTINUED | OUTPATIENT
Start: 2025-02-25 | End: 2025-02-25 | Stop reason: HOSPADM

## 2025-02-25 RX ORDER — NALOXONE HYDROCHLORIDE 0.4 MG/ML
INJECTION, SOLUTION INTRAMUSCULAR; INTRAVENOUS; SUBCUTANEOUS PRN
Status: DISCONTINUED | OUTPATIENT
Start: 2025-02-25 | End: 2025-02-25 | Stop reason: HOSPADM

## 2025-02-25 RX ORDER — KETOROLAC TROMETHAMINE 15 MG/ML
7.5 INJECTION, SOLUTION INTRAMUSCULAR; INTRAVENOUS EVERY 6 HOURS
Status: DISCONTINUED | OUTPATIENT
Start: 2025-02-25 | End: 2025-02-25 | Stop reason: HOSPADM

## 2025-02-25 RX ORDER — FENTANYL CITRATE 0.05 MG/ML
25 INJECTION, SOLUTION INTRAMUSCULAR; INTRAVENOUS EVERY 5 MIN PRN
Status: DISCONTINUED | OUTPATIENT
Start: 2025-02-25 | End: 2025-02-25 | Stop reason: HOSPADM

## 2025-02-25 RX ORDER — ONDANSETRON 2 MG/ML
4 INJECTION INTRAMUSCULAR; INTRAVENOUS
Status: DISCONTINUED | OUTPATIENT
Start: 2025-02-25 | End: 2025-02-25 | Stop reason: HOSPADM

## 2025-02-25 RX ADMIN — OXYCODONE HYDROCHLORIDE 10 MG: 10 TABLET, FILM COATED, EXTENDED RELEASE ORAL at 07:57

## 2025-02-25 RX ADMIN — OXYCODONE 5 MG: 5 TABLET ORAL at 14:16

## 2025-02-25 RX ADMIN — SENNOSIDES AND DOCUSATE SODIUM 1 TABLET: 50; 8.6 TABLET ORAL at 14:17

## 2025-02-25 RX ADMIN — SODIUM CHLORIDE: 9 INJECTION, SOLUTION INTRAVENOUS at 12:48

## 2025-02-25 RX ADMIN — SODIUM CHLORIDE, POTASSIUM CHLORIDE, SODIUM LACTATE AND CALCIUM CHLORIDE: 600; 310; 30; 20 INJECTION, SOLUTION INTRAVENOUS at 10:41

## 2025-02-25 RX ADMIN — ASPIRIN 81 MG: 81 TABLET, COATED ORAL at 12:44

## 2025-02-25 RX ADMIN — CEFAZOLIN 2000 MG: 2 INJECTION, POWDER, FOR SOLUTION INTRAMUSCULAR; INTRAVENOUS at 09:30

## 2025-02-25 RX ADMIN — CELECOXIB 200 MG: 100 CAPSULE ORAL at 07:57

## 2025-02-25 RX ADMIN — PROPOFOL 50 MG: 10 INJECTION, EMULSION INTRAVENOUS at 09:13

## 2025-02-25 RX ADMIN — SODIUM CHLORIDE, POTASSIUM CHLORIDE, SODIUM LACTATE AND CALCIUM CHLORIDE: 600; 310; 30; 20 INJECTION, SOLUTION INTRAVENOUS at 07:53

## 2025-02-25 RX ADMIN — BUPIVACAINE HYDROCHLORIDE IN DEXTROSE 15 MG: 7.5 INJECTION, SOLUTION SUBARACHNOID at 08:57

## 2025-02-25 RX ADMIN — TRANEXAMIC ACID 1950 MG: 650 TABLET ORAL at 07:57

## 2025-02-25 RX ADMIN — MIDAZOLAM HYDROCHLORIDE 2 MG: 2 INJECTION, SOLUTION INTRAMUSCULAR; INTRAVENOUS at 08:57

## 2025-02-25 RX ADMIN — ACETAMINOPHEN 650 MG: 325 TABLET ORAL at 12:44

## 2025-02-25 RX ADMIN — KETOROLAC TROMETHAMINE 7.5 MG: 15 INJECTION, SOLUTION INTRAMUSCULAR; INTRAVENOUS at 12:44

## 2025-02-25 RX ADMIN — ACETAMINOPHEN 1000 MG: 500 TABLET ORAL at 07:57

## 2025-02-25 RX ADMIN — HYDROXYZINE HYDROCHLORIDE 10 MG: 10 TABLET, FILM COATED ORAL at 14:16

## 2025-02-25 RX ADMIN — PROPOFOL 160 MCG/KG/MIN: 10 INJECTION, EMULSION INTRAVENOUS at 09:14

## 2025-02-25 ASSESSMENT — PAIN SCALES - GENERAL
PAINLEVEL_OUTOF10: 0
PAINLEVEL_OUTOF10: 7
PAINLEVEL_OUTOF10: 0
PAINLEVEL_OUTOF10: 0
PAINLEVEL_OUTOF10: 2
PAINLEVEL_OUTOF10: 0
PAINLEVEL_OUTOF10: 0

## 2025-02-25 ASSESSMENT — PAIN - FUNCTIONAL ASSESSMENT: PAIN_FUNCTIONAL_ASSESSMENT: 0-10

## 2025-02-25 ASSESSMENT — PAIN DESCRIPTION - LOCATION: LOCATION: HIP

## 2025-02-25 ASSESSMENT — LIFESTYLE VARIABLES: SMOKING_STATUS: 1

## 2025-02-25 NOTE — ANESTHESIA PROCEDURE NOTES
Spinal Block    Patient location during procedure: procedural area  End time: 2/25/2025 9:04 AM  Reason for block: primary anesthetic  Staffing  Performed: anesthesiologist   Anesthesiologist: Natali Lao MD  Performed by: Natali Lao MD  Authorized by: Natali Lao MD    Spinal Block  Patient position: sitting  Prep: ChloraPrep  Patient monitoring: continuous pulse ox and frequent blood pressure checks  Approach: midline  Location: L2/L3  Provider prep: mask and sterile gloves  Local infiltration: lidocaine  Needle  Needle type: Pencan   Needle gauge: 25 G  Needle length: 5 in  Assessment  Swirl obtained: Yes  CSF: clear  Attempts: 1  Hemodynamics: stable  Additional Notes  Consent obtained and timeout performed. Patient positioned in sitting position and lumbar spine palpated. Field prepped and draped in sterile fashion. Skin anesthetized with 3ml 1% lidocaine. Guide needle placed midline at L2-L3 level, followed by spinal needle. Spinal needle advanced midline without difficult, no os noted. CSF obtained, swirl present and 2.0 ml of hyperbaric bupivacaine injected. Patient tolerated well.   Preanesthetic Checklist  Completed: patient identified, IV checked, site marked, risks and benefits discussed, surgical/procedural consents, equipment checked, pre-op evaluation, timeout performed, anesthesia consent given, oxygen available and monitors applied/VS acknowledged

## 2025-02-25 NOTE — ANESTHESIA PRE PROCEDURE
Department of Anesthesiology  Preprocedure Note       Name:  Robi Dotson   Age:  44 y.o.  :  1980                                          MRN:  [id number]         Date:  2025      Surgeon: Surgeon(s):  Miguel Conley MD    Procedure: Procedure(s):  Left total hip arthroplasty - ANTERIOR Case Comments/Implants: [place] Accolade Stem and Trident 2 cup System Anesthesia Requested: Choice  ZACK AWARE    Medications prior to admission:   Prior to Admission medications    Medication Sig Start Date End Date Taking? Authorizing Provider   chlorhexidine gluconate (HIBICLENS) 4 % SOLN external solution Use daily for 3 days prior to surgery 25  Yes Colten Chino PA-C   Misc. Devices (WALKER) MISC 1 each by Does not apply route daily Front wheeled walker -  fax to [phone] 25   Colten Chino PA-C   Misc. Devices (CANE) MISC 1 Units by Does not apply route as needed (ambulation) 25   Colten Chino PA-C   Misc. Devices (RAISED TOILET SEAT) MISC Dispense 1 raised toilet seat 25   Colten Chino PA-C   Misc. Devices (BATH/SHOWER SEAT) MISC Dispense 1 Shower/Bath seat 25   Colten Chino PA-C   Misc. Devices (BATH/SHOWER SEAT) MISC Dispense 1 Shower/Bath seat 25   Miguel Conley MD   Misc. Devices (CLASSICS ROLLING WALKER) MISC Dispense 1 rolling walker 25   Miguel Conley MD   Misc. Devices (RAISED TOILET SEAT) MISC Dispense 1 raised toilet seat 25   Miguel Conley MD   ibuprofen (ADVIL;MOTRIN) 600 MG tablet Take 1 tablet by mouth 3 times daily as needed for Pain 24   Daron Castillo MD       Current medications:    Current Facility-Administered Medications   Medication Dose Route Frequency Provider Last Rate Last Admin   • acetaminophen (TYLENOL) tablet 1,000 mg  1,000 mg Oral Once Lisa Luna APRN - CNP       • celecoxib (CELEBREX) capsule 200 mg  200 mg Oral Once Lisa Luna, APRN - CNP       • tranexamic acid

## 2025-02-25 NOTE — PROGRESS NOTES
9252 Dr. Lao at bedside. Time out completed for spinal with Dr. Lao, patient, MS RN and this RN.  2976 Procedure complete. Pt jarred well. VS obtained. See EMR

## 2025-02-25 NOTE — DISCHARGE INSTRUCTIONS
Dr. Miguel Conley Jr., MD  [facility]    Post Operative Instructions for Total Hip Replacement    Incision and dressing  Your incision is covered with a waterproof Aquacel dressing.  It has been impregnated with silver nitrate to help trevino off infection.  The dressing is to be removed 7 days after the date of your surgery.    The incision has been closed with skin glue.  The glue will flake off over time and fall off on its own.  DO NOT apply any lotions, creams, peroxide or Betadine to the skin glue, as it will degrade and dissolve the glue.  DO NOT peel the glue off, as this could result in opening of the incision.  There are no sutures that need to be removed; the skin and subcutaneous layers have been closed with dissolvable sutures, which will dissolve over 7 to 8 weeks.    Showering  The Aquacel dressing is waterproof.  You may shower when you feel ready.  Once the Aquacel dressing has been removed, you may continue to shower.  The glue provides a waterproof seal to the incision.  Let the water run over the incision, and pat dry with a towel.  Do not scrub or rub the glue.    Compression stockings  The compression stockings/RACHNA hose are used to help reduce swelling and assist in the prevention of blood clots.  They are to be worn on the operative leg for 3 weeks.  They should be worn full-time during the day, but may be removed at nighttime or during periods of elevation during the day.  They are optional on the nonoperative leg, depending on how much swelling may be encountered.    Activity  If your incision is near your groin (anterior approach) you have no restrictions other than to avoid a figure-of-four position with the operative leg crossed over the nonoperative leg.  If your incision is on the side/back of your hip (lateral approach) you will have hip precautions, meaning no bending past 90 degrees, no crossing your legs, no sleeping on your operative side, and it is recommended to use

## 2025-02-25 NOTE — OP NOTE
Operative Note      Patient: Robi Dotson  YOB: 1980  MRN: [id number]    Date of Procedure: 2/25/2025    Pre-Op Diagnosis Codes:      * Avascular necrosis of femoral head, left (HCC) [M87.052] with collapse of the head    Post-Op Diagnosis: Same       Procedure(s):  Left total hip arthroplasty - ANTERIOR    Surgeon(s):  Miguel Conley MD    Assistant:   Physician Assistant: Nicole Sanchez PA-C; Colten Chino PA-C -'s assistance consisted of assistance with positioning of the limb, retraction and and closing the wound    Anesthesia: Spinal    Estimated Blood Loss (mL): less than 100     Complications: None    Specimens:   * No specimens in log *    Implants:  Implant Name Type Inv. Item Serial No.  Lot No. LRB No. Used Action   SHELL ACET SZ F [id number] CLUS H TRITANIUM PRESSFIT MEEK - [id number]  SHELL ACET SZ F [id number] CLUS H TRITANIUM PRESSFIT MEEK  [employer] [facility] [id number] Left 1 Implanted   INSERT ACET F 0 DEG 40 MM HIP X3 TRIDENT - [id number]  INSERT ACET F 0 DEG 40 MM HIP X3 TRIDENT  [employer] [facility] [id number] Left 1 Implanted   STEM FEM SZ 11 L102MM NK L30MM 58MM OFFSET 127DEG HIP TI - [id number]  STEM FEM SZ 11 L102MM NK L30MM 58MM OFFSET 127DEG HIP TI  [employer] [facility] [id number] Left 1 Implanted   HEAD FEM [id number] +0MM OFFSET UNIV HIP CERAMIC BIOLOX DELT - [id number]  HEAD FEM [id number] +0MM OFFSET UNIV HIP CERAMIC BIOLOX DELT  [employer] [facility] [id number] Left 1 Implanted   ADAPTER SL +0MM OFFSET UNIV HIP TI V40 TAPR ACCOLADE II - [id number]  ADAPTER SL +0MM OFFSET UNIV HIP TI V40 TAPR ACCOLADE II  [employer] [facility] [id number] Left 1 Implanted         Drains: * No LDAs found *    Findings:  Infection Present At Time Of Surgery (PATOS) (choose all levels that have infection present):  No infection present  Other Findings: Avascular necrosis of the left femoral head with collapse and significant synovitis    Detailed

## 2025-02-25 NOTE — DISCHARGE INSTR - COC
Continuity of Care Form    Patient Name: Robi Majano   :  1980  MRN:  [id number]    Admit date:  2025  Discharge date:  25    Code Status Order: Full Code   Advance Directives:   Advance Care Flowsheet Documentation        Date/Time Healthcare Directive Type of Healthcare Directive Copy in Chart Healthcare Agent Appointed Healthcare Agent's Name Healthcare Agent's Phone Number    25 0743 No, patient does not have an advance directive for healthcare treatment  --  --  --  --  --                     Admitting Physician:  Miguel Conley MD  PCP: Daron Castillo MD    Discharging Nurse: Irene GARCÍA   Discharging Hospital Unit/Room#: 0222/0222-01  Discharging Unit Phone Number: [phone]    Emergency Contact:   Extended Emergency Contact Information  Primary Emergency Contact: tierney majano  Home Phone: [phone]  Relation: Other Relative    Past Surgical History:  History reviewed. No pertinent surgical history.    Immunization History:   Immunization History   Administered Date(s) Administered    COVID-19, PFIZER PURPLE top, DILUTE for use, (age 12 y+), 30mcg/0.3mL 2021, 10/07/2021       Active Problems:  Patient Active Problem List   Diagnosis Code    Bilateral hip pain M25.551, M25.552    Transaminitis R74.01    Prediabetes R73.03    Avascular necrosis of bones of both hips (HCC) M87.051, M87.052    Avascular necrosis of femoral head, left (HCC) M87.052    Status post total hip replacement, left Z96.642       Isolation/Infection:   Isolation            No Isolation          Patient Infection Status       None to display            Nurse Assessment:  Last Vital Signs: BP (!) 136/95   Pulse 81   Temp 98.6 °F (37 °C)   Resp 12   Ht 1.956 m (6' 5\")   Wt 68 kg (150 lb)   SpO2 99%   BMI 17.79 kg/m²     Last documented pain score (0-10 scale): Pain Level: 2  Last Weight:   Wt Readings from Last 1 Encounters:   25 68 kg (150 lb)     Mental Status:  oriented, alert,

## 2025-02-25 NOTE — H&P
The history and physical in the electronic medical record dated 2/18/2025 was personally reviewed.  There are no interval changes that need to be made.  Plan is to proceed with a left anterior total hip as scheduled. The patient is opted to proceed with a hip replacement.  I brought the model in.  We sat down and we talked about the surgery.  We talked about the recovery.  I stressed to the patient the importance of exercise and participation in physical therapy in order to ensure a good outcome.  We talked about the recovery.  We went over the risks of surgery.  Risks include, but are not limited to, infection, neurovascular injury with special attention to possible lateral femoral cutaneous nerve palsy, hematoma formation, wound healing complications, blood clots, intraoperative fracture, postoperative hip instability, limb length discrepancy, persistent postoperative pain and risks of anesthesia.  The patient expressed understanding and wishes to proceed with a hip replacement as discussed above.

## 2025-02-25 NOTE — PROGRESS NOTES
Pt admitted to pacu, bed 2.  Pt awake, alert, and oriented.  Report received from OR nurse and anesthesia.  VSS.  Pulse ox 98% on RA  IV infusing without difficulty.  Left hip dressing clean, dry, and intact.  Bilat LE normal in color, warm to touch, cap refill less than 3 seconds, +3 pedal pulses.  Pt has sensation at hip area only. Unable to move LE.  Pt denies pain.  X ray completed.

## 2025-02-25 NOTE — PROGRESS NOTES
Facility/Department: [facility] SURG UNIT  Physical Therapy Initial Assessment    Name: Robi Doston  : 1980  MRN: [id number]  Date of Service: 2025    Discharge Recommendations:  Continue to assess pending progress, Home with Home health PT, Home with assist PRN          Patient Diagnosis(es):L hip avascular necrosis post L DAWN anterior with difficulty wlaking, pain and weakness    Past Surgical History:  has no past surgical history on file.    Assessment  Body Structures, Functions, Activity Limitations Requiring Skilled Therapeutic Intervention: Decreased functional mobility ;Decreased ROM;Decreased strength;Decreased ADL status;Decreased tolerance to work activity;Increased pain  Assessment: 44yom with avascular necrois L hip resulting in need for L DAWN. Pt with pain at 7-8/10 anterior hip upon PT arrival, RN notified, with meds and gentle AAROM using sheet down to 5/10. Pt with good safety awareness and use of FWW.  Advised first 24 hours somebody with him for PRN assist. Pt will benefit from HHC and then Op PT to achieve ful use of new L DAWN and reach functional walking goals for RTW- ability to get a new job. Two ice packs and seat cushion issued for pressure and pain relief post eval and tx.  Treatment Diagnosis: difficulty wlakign a,. pain and weakness post L DAWN anterior  Therapy Prognosis: Good  Decision Making: Medium Complexity  Requires PT Follow-Up: Yes  Activity Tolerance  Activity Tolerance: Patient limited by pain;Patient tolerated treatment well  Activity Tolerance Comments: initial pain 7/10 , with meds and gentle exercise 5/10 , walking remains at 5/10 L anterior hip    Plan  Safety Devices  Type of Devices: Call light within reach, Gait belt, Nurse notified, Left in chair    Restrictions  Restrictions/Precautions  Restrictions/Precautions: Surgical Protocols, Weight Bearing  Lower Extremity Weight Bearing Restrictions  Left Lower Extremity Weight Bearing: Weight Bearing As Tolerated

## 2025-02-25 NOTE — PROGRESS NOTES
Discharge instructions reviewed with patient and girlfriend, Pt verbalizes understanding. Pt able to be wheeled to car and got in without issues.

## 2025-02-25 NOTE — CARE COORDINATION
This LSW met with patient and his mother at bedside.  Patient had a left total hip this morning.  Patient was seen by therapy and they are recommending [facility].  List of providers provided to patient and he chose [facility].  Referral made.  Patient will be DC'ing home with his girlfriend.  Patient has a walker, shower bench, and a raised toilet seat.  Patient uses [facility] in [place] on [address] for his pharmacy.  Plan is for patient to be Dc'ed this evening.  Electronically signed by KATHRINE Tolentino on 2/25/25 at 4:06 PM EST

## 2025-02-26 ENCOUNTER — TELEPHONE (OUTPATIENT)
Dept: PRIMARY CARE CLINIC | Age: 45
End: 2025-02-26

## 2025-02-26 NOTE — TELEPHONE ENCOUNTER
Care Transitions Initial Follow Up Call    Outreach made within 2 business days of discharge: Yes    Patient: Robi Dotson Patient : 1980   MRN: [id number]  Reason for Admission: Avascular necrosis of femoral head, left (HCC)   Discharge Date: 25       Spoke with: PT     Discharge department/facility: [facility] Interactive Patient Contact:  Was patient able to fill all prescriptions: Yes  Was patient instructed to bring all medications to the follow-up visit: Yes  Is patient taking all medications as directed in the discharge summary? Yes  Does patient understand their discharge instructions: Yes  Does patient have questions or concerns that need addressed prior to 7-14 day follow up office visit: no    Additional needs identified to be addressed with provider  No needs identified             Scheduled appointment with PCP within 7-14 days    Follow Up  Future Appointments   Date Time Provider Department Center   3/6/2025  5:45 PM Daron Castillo MD [facility]   3/13/2025 11:30 AM Colten Chino PA-C [facility]   2025  3:00 PM Daron Castillo MD [place] [facility]       Vira Pederson MA

## 2025-03-06 ENCOUNTER — OFFICE VISIT (OUTPATIENT)
Dept: PRIMARY CARE CLINIC | Age: 45
End: 2025-03-06

## 2025-03-06 VITALS
OXYGEN SATURATION: 98 % | SYSTOLIC BLOOD PRESSURE: 118 MMHG | WEIGHT: 158.8 LBS | HEART RATE: 111 BPM | BODY MASS INDEX: 18.75 KG/M2 | HEIGHT: 77 IN | DIASTOLIC BLOOD PRESSURE: 64 MMHG

## 2025-03-06 DIAGNOSIS — M87.051 AVASCULAR NECROSIS OF BONES OF BOTH HIPS (HCC): ICD-10-CM

## 2025-03-06 DIAGNOSIS — M87.052 AVASCULAR NECROSIS OF BONES OF BOTH HIPS (HCC): ICD-10-CM

## 2025-03-06 DIAGNOSIS — Z09 HOSPITAL DISCHARGE FOLLOW-UP: Primary | ICD-10-CM

## 2025-03-06 DIAGNOSIS — Z96.642 STATUS POST TOTAL HIP REPLACEMENT, LEFT: ICD-10-CM

## 2025-03-06 SDOH — ECONOMIC STABILITY: FOOD INSECURITY: WITHIN THE PAST 12 MONTHS, YOU WORRIED THAT YOUR FOOD WOULD RUN OUT BEFORE YOU GOT MONEY TO BUY MORE.: NEVER TRUE

## 2025-03-06 SDOH — ECONOMIC STABILITY: FOOD INSECURITY: WITHIN THE PAST 12 MONTHS, THE FOOD YOU BOUGHT JUST DIDN'T LAST AND YOU DIDN'T HAVE MONEY TO GET MORE.: NEVER TRUE

## 2025-03-06 ASSESSMENT — PATIENT HEALTH QUESTIONNAIRE - PHQ9
SUM OF ALL RESPONSES TO PHQ QUESTIONS 1-9: 0
2. FEELING DOWN, DEPRESSED OR HOPELESS: NOT AT ALL
1. LITTLE INTEREST OR PLEASURE IN DOING THINGS: NOT AT ALL
SUM OF ALL RESPONSES TO PHQ QUESTIONS 1-9: 0

## 2025-03-06 NOTE — PROGRESS NOTES
Post-Discharge Transitional Care  Follow Up      Robi Dotson   YOB: 1980    Date of Office Visit:  3/6/2025  Date of Hospital Admission: 2/25/25  Date of Hospital Discharge: 2/25/25  Risk of hospital readmission (high >=14%. Medium >=10%) :No data recorded    Care management risk score Rising risk (score 2-5) and Complex Care (Scores >=6): No Risk Score On File     Non face to face  following discharge, date last encounter closed (first attempt may have been earlier): 02/26/2025    Call initiated 2 business days of discharge: Yes    ASSESSMENT/PLAN:   Hospital discharge follow-up  -     [place] DISCHARGE MEDS RECONCILED W/ CURRENT OUTPATIENT MED LIST  Status post total hip replacement, left  Avascular necrosis of bones of both hips (HCC)    Medical Decision Making: straightforward  No follow-ups on file.           Subjective:   HPI:  Follow up of Hospital problems/diagnosis(es): Left hip replacement    Inpatient course: Discharge summary reviewed- see chart.    Interval history/Current status: Patient doing well, having normal bowel movements, denies any difficulty breathing, denies any fever.    Patient Active Problem List   Diagnosis    Bilateral hip pain    Transaminitis    Prediabetes    Avascular necrosis of bones of both hips (HCC)    Avascular necrosis of femoral head, left (HCC)    Status post total hip replacement, left       Medications listed as ordered at the time of discharge from hospital     Medication List            Accurate as of March 6, 2025 11:59 PM. If you have any questions, ask your nurse or doctor.                CONTINUE taking these medications      aspirin 81 MG EC tablet  Take 1 tablet by mouth in the morning and at bedtime     * Bath/Shower Seat Misc  Dispense 1 Shower/Bath seat     * Classics Rolling Walker Misc  Dispense 1 rolling walker     * Raised Toilet Seat Misc  Dispense 1 raised toilet seat     * Walker Misc  1 each by Does not apply route daily Front wheeled

## 2025-03-20 ENCOUNTER — HOSPITAL ENCOUNTER (OUTPATIENT)
Dept: ORTHOPEDIC SURGERY | Age: 45
Discharge: HOME | End: 2025-03-22
Payer: COMMERCIAL

## 2025-03-20 ENCOUNTER — OFFICE VISIT (OUTPATIENT)
Dept: ORTHOPEDIC SURGERY | Age: 45
End: 2025-03-20

## 2025-03-20 VITALS
HEART RATE: 86 BPM | BODY MASS INDEX: 18.66 KG/M2 | OXYGEN SATURATION: 100 % | HEIGHT: 77 IN | WEIGHT: 158 LBS | TEMPERATURE: 97.7 F

## 2025-03-20 DIAGNOSIS — Z96.642 STATUS POST TOTAL HIP REPLACEMENT, LEFT: ICD-10-CM

## 2025-03-20 DIAGNOSIS — Z96.642 STATUS POST TOTAL HIP REPLACEMENT, LEFT: Primary | ICD-10-CM

## 2025-03-20 PROCEDURE — 73502 X-RAY EXAM HIP UNI 2-3 VIEWS: CPT

## 2025-03-20 PROCEDURE — 99024 POSTOP FOLLOW-UP VISIT: CPT | Performed by: PHYSICIAN ASSISTANT

## 2025-03-20 NOTE — PROGRESS NOTES
Date    WBC 5.4 02/18/2025    HGB 13.7 (L) 02/18/2025    HCT 41.8 (L) 02/18/2025    MCV 94.4 (H) 02/18/2025     02/18/2025     No results found for: \"SEDRATE\"  No results found for: \"CRP\"    Assessment and Plan:      Diagnosis Orders   1. Status post total hip replacement, left  XR HIP LEFT (2-3 VIEWS)          Robi is a very nice young man here today who unfortunately he had a hip replacement a very young age.  He is 2 weeks postop.  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  Incision is healing wonderfully.  No signs of infection  Doing very well from a hip postop perspective.  We will continue working with therapy as outpatient.  Order was placed for this.  He has no restrictions.  He can go back to work as long as he is not doing very heavy laborist activity.  Will continue with aspirin for large 2 weeks different blood clots.  Tylenol or Motrin as needed for pain.  I will see him back in 6 to 8 weeks for likely his last visit with us.      Orders Placed This Encounter   Procedures    XR HIP LEFT (2-3 VIEWS)     Standing Status:   Future     Number of Occurrences:   1     Expected Date:   3/20/2025     Expiration Date:   3/17/2026     Scheduling Instructions:      AP of hip and cross table lateral     Reason for exam::   post op     No orders of the defined types were placed in this encounter.      No follow-ups on file.    The documentation above was transcribed by Dragon/[employer]uance, a voice-to-text converter for medical documentation. This can sometimes produce grammatical errors and lead to patient misinterpretation.    Colten Chino PA-C  [facility]  [phone]

## (undated) DEVICE — TOTAL HIP: Brand: MEDLINE INDUSTRIES, INC.

## (undated) DEVICE — SUTURE VICRYL SZ 1 L36IN ABSRB UD L36MM CT-1 1/2 CIR J947H

## (undated) DEVICE — ELECTRODE ES L275IN 275IN BLDE TIP COAT PTFE TEF W EVAC

## (undated) DEVICE — DRAPE,T,LIMB,BILATERAL,STERILE: Brand: MEDLINE

## (undated) DEVICE — SYRINGE MED 50ML LUERLOCK TIP

## (undated) DEVICE — ADHESIVE SKIN CLSR 0.7ML TOP DERMBND ADV

## (undated) DEVICE — BANDAGE COBAN 4 IN COMPR W4INXL5YD FOAM COHESIVE QUIK STK SELF ADH SFT

## (undated) DEVICE — GLOVE SURG SZ 9 THK91MIL LTX FREE SYN POLYISOPRENE ANTI

## (undated) DEVICE — NEEDLE SPNL 20GA L3 1 2IN YEL S STL POLYCARB HUB MTL STYL

## (undated) DEVICE — 3M™ STERI-DRAPE™ U-DRAPE 1015: Brand: STERI-DRAPE™

## (undated) DEVICE — Device: Brand: STABLECUT®

## (undated) DEVICE — 450 ML BOTTLE OF 0.05% CHLORHEXIDINE GLUCONATE IN 99.95% STERILE WATER FOR IRRIGATION, USP AND APPLICATOR.: Brand: IRRISEPT ANTIMICROBIAL WOUND LAVAGE

## (undated) DEVICE — STOCKINETTE,IMPERVIOUS,12X48,STERILE: Brand: MEDLINE

## (undated) DEVICE — DRAPE,U/ SHT,SPLIT,PLAS,STERIL: Brand: MEDLINE

## (undated) DEVICE — LABEL MED MINI W/ MARKER

## (undated) DEVICE — SHEET,DRAPE,53X77,STERILE: Brand: MEDLINE

## (undated) DEVICE — SUTURE MONOCRYL STRATAFIX SPRL + SZ 3-0 L12IN ABSRB UD PS-2 SXMP1B106

## (undated) DEVICE — GLOVE ORANGE PI 8 1/2   MSG9085

## (undated) DEVICE — STOCKINETTE ORTH W6XL48IN OFF WHT SGL PLY UNBLEACHED COT RIB

## (undated) DEVICE — SUTURE VICRYL SZ 2-0 L36IN ABSRB UD L36MM CT-1 1/2 CIR J945H

## (undated) DEVICE — HOOD: Brand: T7PLUS

## (undated) DEVICE — BIPOLAR SEALER 23-112-1 AQM 6.0: Brand: AQUAMANTYS ®

## (undated) DEVICE — SUTURE POLY SZ 5 L30IN NONABSORBABLE GRN LR L75MM 3/8 CIR B499T

## (undated) DEVICE — DRAPE EQUIP CARM 120X42 IN ELASTIC BND CLP ASMBLY CLR DISP

## (undated) DEVICE — 4-PORT MANIFOLD: Brand: NEPTUNE 2